# Patient Record
Sex: MALE | Race: WHITE | NOT HISPANIC OR LATINO | URBAN - METROPOLITAN AREA
[De-identification: names, ages, dates, MRNs, and addresses within clinical notes are randomized per-mention and may not be internally consistent; named-entity substitution may affect disease eponyms.]

---

## 2024-06-24 ENCOUNTER — EMERGENCY (EMERGENCY)
Facility: HOSPITAL | Age: 34
LOS: 1 days | Discharge: HOME | End: 2024-06-24
Admitting: EMERGENCY MEDICINE
Payer: COMMERCIAL

## 2024-06-24 VITALS
OXYGEN SATURATION: 98 % | RESPIRATION RATE: 18 BRPM | SYSTOLIC BLOOD PRESSURE: 144 MMHG | HEIGHT: 69 IN | HEART RATE: 68 BPM | WEIGHT: 160.06 LBS | DIASTOLIC BLOOD PRESSURE: 83 MMHG | TEMPERATURE: 99 F

## 2024-06-24 PROCEDURE — 70486 CT MAXILLOFACIAL W/O DYE: CPT | Mod: 26,MC

## 2024-06-24 PROCEDURE — 70450 CT HEAD/BRAIN W/O DYE: CPT | Mod: 26,MC

## 2024-06-24 PROCEDURE — 99284 EMERGENCY DEPT VISIT MOD MDM: CPT | Mod: 25

## 2024-06-24 PROCEDURE — 70486 CT MAXILLOFACIAL W/O DYE: CPT | Mod: MC

## 2024-06-24 PROCEDURE — 99284 EMERGENCY DEPT VISIT MOD MDM: CPT

## 2024-06-24 PROCEDURE — 70450 CT HEAD/BRAIN W/O DYE: CPT | Mod: MC

## 2024-06-24 NOTE — ED PROVIDER NOTE - PATIENT PORTAL LINK FT
You can access the FollowMyHealth Patient Portal offered by [facility] by registering at the following website: [email or web address]. By joining [facility]’s FollowMyHealth portal, you will also be able to view your health information using other applications (apps) compatible with our system.
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
## 2024-06-24 NOTE — ED PROVIDER NOTE - NSFOLLOWUPINSTRUCTIONS_ED_ALL_ED_FT
Take tylenol 650mg or motrin 400-800mg as needed every 4-6 hours for pain.   REST- Rest your hurting/injured joint or extremity to decrease pain and swelling for 24-48 hours    ICE- Apply ice to area of pain to decreased inflammation and pain, put towel/barrier between ice and skin. You can keep ice on for 20 minutes at a time 4-8 times daily      You should follow nasal precautions for 3 weeks-   No Nose blowing.  No flying, swimming or deep water submersion.  Avoid straining with bowel movements.  No strenuous activity or lifting/pushing objects weighing more than 20 pounds.  Sleep with your head elevated on 2 pillows.  No smoking/vaping.    Return to ED if you have any changes to your vision- double vision or loss of vision    Please call [phone]  to arrange appointment with oral-maxillary sinus surgery specialist within one week- Dr. Troy Mortensen    Facial Fracture    WHAT YOU NEED TO KNOW:    What is a facial fracture? A facial fracture is a break in one or more of the bones in your face. A facial fracture may also damage nearby tissue.  Skull    What are the signs and symptoms of a facial fracture?    Pain, swelling, or bruises    Headache    Tingling or numbness    Swollen or flattened cheek    Blurry vision, double vision, or seeing floaters (spots)    Decreased eye movement or pain when you move your eyes    Eyes that are sunken or not in the normal position, or swollen eyelids  How is a facial fracture diagnosed? Tell your healthcare provider about your injury. Your eyesight, pupils, and eye movements will be checked. Your provider may use a device to look inside your eye. He or she will also check your face for skin wounds. You may also need any of the following:    X-ray or CT scan pictures may show broken bones and damaged tissue and blood vessels. You may be given contrast liquid to help the injured area show up better. Tell the provider if you have ever had an allergic reaction to contrast liquid.    An ultrasound may be done to check for damage to your facial bones and tissue.  How is a facial fracture treated? The fracture may be left to heal on its own if the broken bone stays in its normal position. You may need any of the following to treat a severe fracture:    Closed reduction is a procedure to move your broken bones back to their normal positions by hand. Closed reduction is often done to fix a broken nose. You will not need an incision for this procedure.    Endoscopy is a test that uses a scope to look inside your sinuses and eye socket. Small pieces of your broken bone may be removed. Devices may be placed to support the broken bones in your face.    Medicines may be given to prevent or treat pain, swelling, or a bacterial infection.    Orthodontic treatment may be used to fix damaged teeth. Orthodontic treatment may also be done if your teeth do not line up correctly when you close your jaw.    Open reduction and internal fixation (ORIF) is surgery to help keep the bones from moving while they heal. Wires, screws, or plates are used to join broken facial bones.    Reconstructive surgery may be needed to fix damaged areas of your face. Your healthcare provider may need to remove pieces of your broken facial bones and replace them with a graft. A graft is healthy bone taken from another area of your body or from a donor.  How can I care for myself at home?    Apply ice as directed. Ice helps decrease swelling and pain. Ice may also help prevent tissue damage. Use an ice pack, or put crushed ice in a plastic bag. Cover the bag with a towel before you place it on your skin. Apply ice for 15 to 20 minutes every hour or as directed.    Keep your head elevated. Keep your head above the level of your heart as often as you can. This will help decrease swelling and pain. Prop your upper body on pillows or blankets to keep your head elevated comfortably.    Do not put pressure on your face:  Do not sleep on the injured side of your face. Pressure may cause more damage.    Sneeze with your mouth open to decrease pressure on your broken facial bones. Too much pressure from a sneeze may cause your broken bones to move and cause more damage.    Try not to blow your nose. It may cause more damage if you have a fracture near your eye. The pressure from blowing your nose may pinch the nerve of your eye and cause permanent damage.    Clean your mouth carefully. It may be hard to clean your teeth if have an injury or fracture near your mouth. Your healthcare provider will show you the best way to do this so you do not hurt yourself. A waterpik or a child-sized soft toothbrush may work well to clean your mouth.  Call your local emergency number (911 in the [place]) if:    You suddenly feel lightheaded and short of breath.    You have chest pain when you take a deep breath or cough.    You cough up blood.  When should I seek immediate care?    You suddenly have trouble chewing or swallowing.    You have clear or pinkish fluid draining from your nose or mouth.    You have numbness in your face.    You have worsening pain in your eye or face.    You have double vision or sudden trouble seeing.    Your arm or leg feels warm, tender, and painful. It may look swollen and red.  When should I call my doctor?    You are bleeding from a wound on your face.    You have questions or concerns about your condition or care.  CARE AGREEMENT:    You have the right to help plan your care. Learn about your health condition and how it may be treated. Discuss treatment options with your healthcare providers to decide what care you want to receive. You always have the right to refuse treatment.

## 2024-06-24 NOTE — ED ADULT NURSE NOTE - NSFALLUNIVINTERV_ED_ALL_ED
Low left side abd pain x 1week.  Denies any nausea, vomiting or diarrhea Bed/Stretcher in lowest position, wheels locked, appropriate side rails in place/Call bell, personal items and telephone in reach/Instruct patient to call for assistance before getting out of bed/chair/stretcher/Non-slip footwear applied when patient is off stretcher/[place] to call system/Physically safe environment - no spills, clutter or unnecessary equipment/Purposeful proactive rounding/Room/bathroom lighting operational, light cord in reach

## 2024-06-24 NOTE — CHART NOTE - NSCHARTNOTEFT_GEN_A_CORE
Spoke to ED DANIELLE Sanchez regarding patient. Upon exam, pt's EOMI b/l and no visual changes, or concern for entrapment. CT maxillofacial reviewed with no operative fractures on imaging. Patient can follow up outpatient with Dr. Troy Mortensen. Please call [phone] to schedule an appointment. Pt should be scheduled to be seen by the end of this week if possible. Pt should return to the ED if any changes to vision. Recommend sinus precautions for the next 3 weeks.    Oral and Maxillofacial Surgery   [phone]
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
## 2024-06-24 NOTE — ED PROVIDER NOTE - PHYSICAL EXAMINATION
Vitals reviewed  Gen: well appearing, nad, speaking in full sentences  Skin: wwp, no rash/lesions  HEENT: nc, + L inferior orbit w/ ecchymosis, mild ttp inferior and lateral L orbit, no significant edema or deformity, diminished sensation L V2 nerve distribution compared to R, symmetric smile, perrla, eomi, mmm, no e/o malocclusion, airway patent   CV: rrr, no audible m/r/g  Resp: symmetrical expansion, ctab, no w/r/r  Abd: nondistended, soft/nt  Ext: FROM throughout, no peripheral edema, no muscle or joint ttp, distal pulses 2+  Neuro: alert/oriented x3, no focal deficits, steady gait, no ataxia

## 2024-06-24 NOTE — ED PROVIDER NOTE - CARE PROVIDER_API CALL
Troy Mortensen  Oral/Maxillofacial Surgery  [address] [place] [address], [address]  [place] [address]  Phone: [phone]  Fax: [phone]  Follow Up Time:

## 2024-06-24 NOTE — ED ADULT NURSE NOTE - NS_ED_NURSE_TEACHING_TOPIC_ED_A_ED
Laceration Repair    Date/Time: 4/2/2023 4:00 PM  Performed by: Daniel Marquez PA-C  Authorized by: Daniel Marquez PA-C   Body area: head/neck  Location details: left eyebrow  Laceration length: 1 cm  Tendon involvement: none  Nerve involvement: none  Vascular damage: no  Comments: [place]        
follow up care and return precautions
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
